# Patient Record
Sex: MALE | Race: WHITE
[De-identification: names, ages, dates, MRNs, and addresses within clinical notes are randomized per-mention and may not be internally consistent; named-entity substitution may affect disease eponyms.]

---

## 2019-11-02 ENCOUNTER — RECORD ABSTRACTING (OUTPATIENT)
Age: 34
End: 2019-11-02

## 2019-11-02 PROBLEM — Z00.00 ENCOUNTER FOR PREVENTIVE HEALTH EXAMINATION: Status: ACTIVE | Noted: 2019-11-02

## 2020-11-24 ENCOUNTER — TRANSCRIPTION ENCOUNTER (OUTPATIENT)
Age: 35
End: 2020-11-24

## 2020-12-16 ENCOUNTER — TRANSCRIPTION ENCOUNTER (OUTPATIENT)
Age: 35
End: 2020-12-16

## 2021-01-05 ENCOUNTER — TRANSCRIPTION ENCOUNTER (OUTPATIENT)
Age: 36
End: 2021-01-05

## 2021-02-23 ENCOUNTER — TRANSCRIPTION ENCOUNTER (OUTPATIENT)
Age: 36
End: 2021-02-23

## 2021-02-27 ENCOUNTER — TRANSCRIPTION ENCOUNTER (OUTPATIENT)
Age: 36
End: 2021-02-27

## 2023-03-12 ENCOUNTER — FORM ENCOUNTER (OUTPATIENT)
Age: 38
End: 2023-03-12

## 2023-03-13 ENCOUNTER — APPOINTMENT (OUTPATIENT)
Dept: ORTHOPEDIC SURGERY | Facility: CLINIC | Age: 38
End: 2023-03-13
Payer: COMMERCIAL

## 2023-03-13 ENCOUNTER — APPOINTMENT (OUTPATIENT)
Dept: MRI IMAGING | Facility: CLINIC | Age: 38
End: 2023-03-13
Payer: COMMERCIAL

## 2023-03-13 VITALS — WEIGHT: 200 LBS | HEIGHT: 74 IN | BODY MASS INDEX: 25.67 KG/M2

## 2023-03-13 DIAGNOSIS — R22.2 LOCALIZED SWELLING, MASS AND LUMP, TRUNK: ICD-10-CM

## 2023-03-13 DIAGNOSIS — Z78.9 OTHER SPECIFIED HEALTH STATUS: ICD-10-CM

## 2023-03-13 PROCEDURE — L3670: CPT | Mod: LT

## 2023-03-13 PROCEDURE — 71550 MRI CHEST W/O DYE: CPT | Mod: LT

## 2023-03-13 PROCEDURE — 99204 OFFICE O/P NEW MOD 45 MIN: CPT

## 2023-03-13 RX ORDER — NAPROXEN 500 MG/1
500 TABLET ORAL
Qty: 60 | Refills: 2 | Status: ACTIVE | COMMUNITY
Start: 2023-03-13 | End: 1900-01-01

## 2023-03-14 PROBLEM — R22.2 LOCALIZED SWELLING OF CHEST WALL: Status: ACTIVE | Noted: 2023-03-14

## 2023-03-14 NOTE — IMAGING
[de-identified] : Constitutional: Healthy, and well nourished in no acute distress. \par Psych: Calm, cooperative, grossly normal \par Eyes: Normal, sclera non-icteric \par Ears, Nose, Mouth, Throat: External inspection of nose and ears does not reveal any scars or masses \par Head: Normocephalic \par Neck: Neck appears supple without sign of limited or painful ROM \par Respiratory: Normal effort, no respiratory distress, no cyanosis \par Cardiovascular: Visualized extremities without edema or varicosities, warm, brisk cap refill \par Abdominal/GI: Not examined \par Skin:none on the exposed UE\par Neurological: Patient is awake and alert  \par \par Left chest wall exam\par \par Inspection: ASymmetric with left chest wall swelling about the pectoralis region.  No overt ecchymosis.  No erythema.\par Spurling; No midline point tenderness\par Arm is at side, struggles to remove shirt as any arm motion away from body is uncomfortable.\par \par Palpation: Tenderness at left pectoralis muscle into the medial aspect of the humerus.  Biceps proximal and distal is nontender.  NO overt deformity with biceps contraction with 90deg of elbow flexion\par No tenderness at coracoid, SC, AC, clavicle\par \par Direct palpation of AC Joint: Absent for tenderness\par Crepitus: NO   \par Masses: No palpable masses \par \par Shoulder ROM\par pain with maneuvering arm away from the body in an adducted position, thus i was deferred due to acute injury\par             \par deferred\par \par ARM:  \par Inspection: Muscle Atrophy:absent             \par Palpation: Tenderness as above        \par Crepitus: NO \par Masses : NO \par \par \par

## 2023-03-14 NOTE — DATA REVIEWED
[FreeTextEntry1] : deferred xrays for stat MRI\par \par STAT MRI obtained and patient returned to office\par Reviewed briefly with Dr. Markham- official read not yet back but there is a muscular tear to the pectoralis major muscle on the left chest wall.  maybe some mild signal seen at myotendinous junction but pec major tendon attached at humerus

## 2023-03-14 NOTE — DISCUSSION/SUMMARY
[de-identified] : The patient was advised of the diagnosis. The natural history of the pathology was explained in full to the patient iin layman's terms. \par Here is the plan that we have set forth today.\par 1. stat MRI now then return to the office\par 2. sling for comfort\par 3. Consideration and discussion about using Aleve 500mg BID for pain control for the next weeks-10 days depending on pathology.\par \par The patient and the family understands the plan of care as described above.  All questions have been answered.\par Thank you for allowing me to care for ELHAM.\par Sincerely,\par Diamante Duarte, DO, FAAP, CAQ-SM\par Sports Medicine\par \par \par addendum: patient returned a few hours post his initial presentation.  We were able to review MRI and note that his injury was nonsurgical in nature.\par He was given a sling- prescribed some pain medication for the next week, continue with icing,  told to keep activity very light and follow up in 2 weeks.  In 2 weeks we will reassess tenderness and hopefully start some PT, wean sling.\par any other questions patient is instructed to call the office.

## 2023-03-29 ENCOUNTER — APPOINTMENT (OUTPATIENT)
Dept: ORTHOPEDIC SURGERY | Facility: CLINIC | Age: 38
End: 2023-03-29
Payer: COMMERCIAL

## 2023-03-29 VITALS — WEIGHT: 200 LBS | HEIGHT: 74 IN | BODY MASS INDEX: 25.67 KG/M2

## 2023-03-29 DIAGNOSIS — S29.011D STRAIN OF MUSCLE AND TENDON OF FRONT WALL OF THORAX, SUBSEQUENT ENCOUNTER: ICD-10-CM

## 2023-03-29 PROCEDURE — 99213 OFFICE O/P EST LOW 20 MIN: CPT

## 2023-03-29 NOTE — IMAGING
[de-identified] : Neurological: Patient is awake and alert \par \par Left chest wall exam\par Inspection: ASymmetric with left chest wall swelling (mild today and improved from initial visit) about the pectoralis region. No overt ecchymosis. No erythema.\par C-spine:No midline point tenderness\par Has markedly improved Shoulder ROM today- near complete in all planes- deferred on IR behind back.\par With arm at 90 deg of shoulder flexion and external rotation- there is a possible soft tissue deformity seen in the chest wall in mid muscle belly- possibly area of tear. (that is not appreciated when arm is by his side.)\par Palpation: mild Tenderness at left pectoralis muscle into the medial aspect of the humerus (much improved from initial visit). Biceps proximal and distal is nontender.Hook test- +palpable distal biceps tendon.\par NO overt deformity with biceps contraction with 90deg of elbow flexion\par No tenderness at coracoid, SC, AC, clavicle\par \par Direct palpation of AC Joint: Absent for tenderness\par Crepitus: NO \par Masses: No palpable masses \par \par strength- asked to go slow and not 100% but good active FF< abd and biceps engagement.  Also 4+ with chest adduction.\par No overt issue with arm adduction towards opposite side into a stretched position.\par ARM: \par Inspection: Muscle Atrophy:absent \par Palpation: Tenderness as above \par Crepitus: NO \par Masses : NO \par

## 2023-03-29 NOTE — HISTORY OF PRESENT ILLNESS
[Throbbing] : throbbing [Full time] : Work status: full time [1] : 2 [0] : 0 [Occasional] : occasional [de-identified] : 3/28/23: TODAY- pt is here for follow up visit.  He is doing better.  Swelling is improved.  He did end up developing some ecchymosis in the upper medial humeral area- that has all resolved. He is  feeling better- tender and occasionally gets a reminder of pain when she reaches or stretches out quickly.  Occasionally when rolling over in bed but it is much improved overall. Has gone to gym and done some super light biceps and upper extremity work but no chest.  No paresthesias.  No radicular pain\par \par \par 3/13/23:  38 yo male with left chest wall pain here for initial exam. He states he was in gym at 8:30am this morning, was doing bench press when he felt a pop.  He did warm up and stretch before starting workup.\par Was on set two- typical amount of weight in a full flexed position- bar to chest and felt a pop to the left chest wall.\par \par Immediately stopped workout- Applied ice this morning after injury. and made the appointment with us today.\par He had a brief amount of paresthesias on ulnar side of forearm.\par Most of the pain is left pec into left medial humerus area.\par \par Swelling seen but no overt ecchymosis.\par \par Otherwise healthy and well\par \par Takes finesteride for hair loss otherwise no prescription meds.\par \par ALLERGY to ceclor.\par \par \par \par \par Review of Systems: \par Constitutional:  no fever, fatigue or recent weight loss \par HEENT: negative \par CV: negative \par Pulm: negative \par GI: negative \par : negative \par Neuro: negative \par Skin: negative \par Endocrine: negative \par Heme: negative \par MSK: See HPI.\par  [] : no [FreeTextEntry1] : left shoulder [de-identified] : MRI [de-identified] :

## 2023-03-29 NOTE — DISCUSSION/SUMMARY
[de-identified] : \par The patient was advised of the diagnosis- changes I am seeing in the office today, and we discussed plans going forward.\par 1. at this point he is much improved but now we need to begin some PT to assist with swelling control even further, work with early healing and scar tissue formation as well as eventual stretching and restrengthening in and around the area.\par 2. PT prescription placed and locations advised.\par 3. continue to exude caution with activity, no chest exercises in the gym; ice if sore.\par 4. Follow up in 6 weeks to recheck progress with PT at that time.\par \par The patient understands the plan of care as described above.  All questions have been answered.\par Thank you for allowing me to care for ELHAM.\par Sincerely,\par Diamante Duarte, DO, FAAP, CAQ-SM\par Sports Medicine\par \par

## 2023-05-10 ENCOUNTER — APPOINTMENT (OUTPATIENT)
Dept: ORTHOPEDIC SURGERY | Facility: CLINIC | Age: 38
End: 2023-05-10
Payer: COMMERCIAL

## 2023-05-10 VITALS — HEIGHT: 74 IN | WEIGHT: 200 LBS | BODY MASS INDEX: 25.67 KG/M2

## 2023-05-10 DIAGNOSIS — S29.011D STRAIN OF MUSCLE AND TENDON OF FRONT WALL OF THORAX, SUBSEQUENT ENCOUNTER: ICD-10-CM

## 2023-05-10 PROCEDURE — 99213 OFFICE O/P EST LOW 20 MIN: CPT

## 2023-05-12 NOTE — IMAGING
[de-identified] : \par Neurological: Patient is awake and alert \par \par Left chest wall exam\par Inspection: ASymmetric  chest - pectoralis muscle larger on the left than the right.  With pec contraction- resisted Adduction there is left chest wall deformity. a bit of a gap, and a pulling away at the midline and some deformity more laterally where the tear was. No overt ecchymosis. No erythema.\par Normal shoulder ROM and 5/5 strength with deltoid, biceps, external rotation and only 4+/5 with adduction\par Palpation: no tenderness to the left pec muscle.\par NO overt deformity with biceps contraction with 90deg of elbow flexion\par No tenderness at coracoid, SC, AC, clavicle\par \par Direct palpation of AC Joint: Absent for tenderness\par Crepitus: NO \par Masses: No palpable masses \par \par ARM: \par Inspection: Muscle Atrophy:absent \par Palpation: Tenderness as above \par Crepitus: NO \par Masses : NO \par  \par

## 2023-05-12 NOTE — IMAGING
[de-identified] : \par Neurological: Patient is awake and alert \par \par Left chest wall exam\par Inspection: ASymmetric  chest - pectoralis muscle larger on the left than the right.  With pec contraction- resisted Adduction there is left chest wall deformity. a bit of a gap, and a pulling away at the midline and some deformity more laterally where the tear was. No overt ecchymosis. No erythema.\par Normal shoulder ROM and 5/5 strength with deltoid, biceps, external rotation and only 4+/5 with adduction\par Palpation: no tenderness to the left pec muscle.\par NO overt deformity with biceps contraction with 90deg of elbow flexion\par No tenderness at coracoid, SC, AC, clavicle\par \par Direct palpation of AC Joint: Absent for tenderness\par Crepitus: NO \par Masses: No palpable masses \par \par ARM: \par Inspection: Muscle Atrophy:absent \par Palpation: Tenderness as above \par Crepitus: NO \par Masses : NO \par  \par

## 2023-05-12 NOTE — DISCUSSION/SUMMARY
[de-identified] : \par The patient was advised of the diagnosis- changes I am seeing in the office today and plans going forward.\par likely the deformity will not change\par continue to build strength slowly; follow the 10% rule such that you don’t increase weight or reps too quickly\par continue anterior chest wall stretches.\par continue to work postural stability and scapulothoracic strength.\par follow up with me as needed.\par The patient understands the plan of care as described above.  All questions have been answered.\par Thank you for allowing me to care for ELHAM.\par Sincerely,\par Diamante Duarte, DO, FAAP, CAQ-SM\par Sports Medicine\par \par

## 2023-05-12 NOTE — HISTORY OF PRESENT ILLNESS
[8] : 8 [5] : 5 [Nothing helps with pain getting better] : Nothing helps with pain getting better [de-identified] : today 5/10/23: patient returns for follow up.\par He has been in PT at [facility]- about 10 sessions to date.  WOrking on easy pec strengthening, pec and anterior chest wall flexibility as well as some posterior scapulothoracic work.\par He is back in the gym.  Most other aspects of his routine on unchanged and he has been able to get back to biceps, triceps and core.\par He is doing inclined table push up, but has not gone completely horizontal to date.\par \par 3/28/23: - pt is here for follow up visit.  He is doing better.  Swelling is improved.  He did end up developing some ecchymosis in the upper medial humeral area- that has all resolved. He is  feeling better- tender and occasionally gets a reminder of pain when she reaches or stretches out quickly.  Occasionally when rolling over in bed but it is much improved overall. Has gone to gym and done some super light biceps and upper extremity work but no chest.  No paresthesias.  No radicular pain\par \par \par 3/13/23:  36 yo male with left chest wall pain here for initial exam. He states he was in gym at 8:30am this morning, was doing bench press when he felt a pop.  He did warm up and stretch before starting workup.\par Was on set two- typical amount of weight in a full flexed position- bar to chest and felt a pop to the left chest wall.\par \par Immediately stopped workout- Applied ice this morning after injury. and made the appointment with us today.\par He had a brief amount of paresthesias on ulnar side of forearm.\par Most of the pain is left pec into left medial humerus area.\par \par Swelling seen but no overt ecchymosis.\par \par Otherwise healthy and well\par \par Takes finesteride for hair loss otherwise no prescription meds.\par \par ALLERGY to ceclor.\par \par \par \par \par Review of Systems: \par Constitutional:  no fever, fatigue or recent weight loss \par \par Neuro: negative \par Skin: negative \par Endocrine: negative \par Heme: negative \par MSK: See HPI.\par  [] : no

## 2023-05-12 NOTE — HISTORY OF PRESENT ILLNESS
[8] : 8 [5] : 5 [Nothing helps with pain getting better] : Nothing helps with pain getting better [de-identified] : today 5/10/23: patient returns for follow up.\par He has been in PT at [facility]- about 10 sessions to date.  WOrking on easy pec strengthening, pec and anterior chest wall flexibility as well as some posterior scapulothoracic work.\par He is back in the gym.  Most other aspects of his routine on unchanged and he has been able to get back to biceps, triceps and core.\par He is doing inclined table push up, but has not gone completely horizontal to date.\par \par 3/28/23: - pt is here for follow up visit.  He is doing better.  Swelling is improved.  He did end up developing some ecchymosis in the upper medial humeral area- that has all resolved. He is  feeling better- tender and occasionally gets a reminder of pain when she reaches or stretches out quickly.  Occasionally when rolling over in bed but it is much improved overall. Has gone to gym and done some super light biceps and upper extremity work but no chest.  No paresthesias.  No radicular pain\par \par \par 3/13/23:  36 yo male with left chest wall pain here for initial exam. He states he was in gym at 8:30am this morning, was doing bench press when he felt a pop.  He did warm up and stretch before starting workup.\par Was on set two- typical amount of weight in a full flexed position- bar to chest and felt a pop to the left chest wall.\par \par Immediately stopped workout- Applied ice this morning after injury. and made the appointment with us today.\par He had a brief amount of paresthesias on ulnar side of forearm.\par Most of the pain is left pec into left medial humerus area.\par \par Swelling seen but no overt ecchymosis.\par \par Otherwise healthy and well\par \par Takes finesteride for hair loss otherwise no prescription meds.\par \par ALLERGY to ceclor.\par \par \par \par \par Review of Systems: \par Constitutional:  no fever, fatigue or recent weight loss \par \par Neuro: negative \par Skin: negative \par Endocrine: negative \par Heme: negative \par MSK: See HPI.\par  [] : no

## 2023-05-12 NOTE — DISCUSSION/SUMMARY
[de-identified] : \par The patient was advised of the diagnosis- changes I am seeing in the office today and plans going forward.\par likely the deformity will not change\par continue to build strength slowly; follow the 10% rule such that you don’t increase weight or reps too quickly\par continue anterior chest wall stretches.\par continue to work postural stability and scapulothoracic strength.\par follow up with me as needed.\par The patient understands the plan of care as described above.  All questions have been answered.\par Thank you for allowing me to care for ELHAM.\par Sincerely,\par Diamante Duarte, DO, FAAP, CAQ-SM\par Sports Medicine\par \par

## 2023-11-29 ENCOUNTER — APPOINTMENT (OUTPATIENT)
Dept: ORTHOPEDIC SURGERY | Facility: CLINIC | Age: 38
End: 2023-11-29
Payer: COMMERCIAL

## 2023-11-29 DIAGNOSIS — S43.402D UNSPECIFIED SPRAIN OF LEFT SHOULDER JOINT, SUBSEQUENT ENCOUNTER: ICD-10-CM

## 2023-11-29 PROCEDURE — 99213 OFFICE O/P EST LOW 20 MIN: CPT

## 2023-11-29 PROCEDURE — 73030 X-RAY EXAM OF SHOULDER: CPT | Mod: LT

## 2023-12-05 ENCOUNTER — APPOINTMENT (OUTPATIENT)
Dept: MRI IMAGING | Facility: CLINIC | Age: 38
End: 2023-12-05
Payer: COMMERCIAL

## 2023-12-05 PROCEDURE — 73221 MRI JOINT UPR EXTREM W/O DYE: CPT | Mod: LT

## 2023-12-12 ENCOUNTER — APPOINTMENT (OUTPATIENT)
Dept: ORTHOPEDIC SURGERY | Facility: CLINIC | Age: 38
End: 2023-12-12
Payer: COMMERCIAL

## 2023-12-12 VITALS — HEIGHT: 74 IN | BODY MASS INDEX: 25.67 KG/M2 | WEIGHT: 200 LBS

## 2023-12-12 PROCEDURE — 99213 OFFICE O/P EST LOW 20 MIN: CPT

## 2024-01-09 ENCOUNTER — APPOINTMENT (OUTPATIENT)
Age: 39
End: 2024-01-09
Payer: COMMERCIAL

## 2024-01-09 PROCEDURE — 29826 SHO ARTHRS SRG DECOMPRESSION: CPT | Mod: AS,LT

## 2024-01-09 PROCEDURE — 29806 SHO ARTHRS SRG CAPSULORRAPHY: CPT | Mod: AS,LT

## 2024-01-09 PROCEDURE — 29826 SHO ARTHRS SRG DECOMPRESSION: CPT | Mod: LT

## 2024-01-09 PROCEDURE — 29806 SHO ARTHRS SRG CAPSULORRAPHY: CPT | Mod: LT

## 2024-01-09 RX ORDER — ASPIRIN 325 MG/1
325 TABLET, FILM COATED ORAL DAILY
Qty: 14 | Refills: 0 | Status: ACTIVE | COMMUNITY
Start: 2024-01-09 | End: 1900-01-01

## 2024-01-09 RX ORDER — HYDROCODONE BITARTRATE AND ACETAMINOPHEN 5; 325 MG/1; MG/1
5-325 TABLET ORAL
Qty: 30 | Refills: 0 | Status: ACTIVE | COMMUNITY
Start: 2024-01-09 | End: 1900-01-01

## 2024-01-17 ENCOUNTER — APPOINTMENT (OUTPATIENT)
Dept: ORTHOPEDIC SURGERY | Facility: CLINIC | Age: 39
End: 2024-01-17
Payer: COMMERCIAL

## 2024-01-17 VITALS — WEIGHT: 200 LBS | HEIGHT: 74 IN | BODY MASS INDEX: 25.67 KG/M2

## 2024-01-17 PROCEDURE — 99024 POSTOP FOLLOW-UP VISIT: CPT

## 2024-01-17 NOTE — HISTORY OF PRESENT ILLNESS
[3] : 3 [Dull/Aching] : dull/aching [de-identified] : Patient s/p left shoulder arthroscopic labral repair, SAD on 1/9/24. PAtient well maintained in the sling, no fever/chills. PAin is manageable.  [] : This patient has had an injection before: no [FreeTextEntry1] : left shoulder  [de-identified] : none  [de-identified] : 1/9/24

## 2024-01-17 NOTE — DISCUSSION/SUMMARY
[de-identified] : Surgical findings reviewed with the patient. Start PT program and dc sling the week of 1/29/24, no ER. HEP to begin at that time reviewed with the patient.  Activities and restrictions reviewed.  01/17/2024    RE:  [facility] MEREDITH   [id number]    Attention:  Nurse Reviewer /Medical Director  I am writing this letter as a medical necessity for PT program. Patient has tried analgesics, non-steroid anti-inflammatory agents,  hot or cold compresses,injections of corticosteroids, etc)  which in combination or by themselves has not worked. Based on my patient's condition, I strongly believe that the PT is medically needed.   Thank you for your time and consideration.     BEGIN WK OF 1/29

## 2024-02-28 ENCOUNTER — APPOINTMENT (OUTPATIENT)
Dept: ORTHOPEDIC SURGERY | Facility: CLINIC | Age: 39
End: 2024-02-28
Payer: COMMERCIAL

## 2024-02-28 PROCEDURE — 99024 POSTOP FOLLOW-UP VISIT: CPT

## 2024-02-28 NOTE — HISTORY OF PRESENT ILLNESS
[3] : 3 [Dull/Aching] : dull/aching [de-identified] : Patient s/p left shoulder arthroscopic labral repair, SAD on 1/9/24. Attending PT Program and is feeling better with the therapy.  [] : This patient has had an injection before: no [FreeTextEntry1] : left shoulder  [FreeTextEntry5] : Pt has been feeling relief with physical therapy  [de-identified] : none  [de-identified] : 1/9/24 [de-identified] : labral repair and SAD

## 2024-02-28 NOTE — DISCUSSION/SUMMARY
[de-identified] : Continue with PT program. HEP discussed. PT advanced.   02/28/2024    RE:  ELHAM HARPER   Acct #- [id number]    Attention:  Nurse Reviewer /Medical Director  I am writing this letter as a medical necessity for PT program. Patient has tried analgesics, non-steroid anti-inflammatory agents,  hot or cold compresses,injections of corticosteroids, etc)  which in combination or by themselves has not worked. Based on my patient's condition, I strongly believe that the PT is medically needed.   Thank you for your time and consideration.

## 2024-04-03 ENCOUNTER — APPOINTMENT (OUTPATIENT)
Dept: ORTHOPEDIC SURGERY | Facility: CLINIC | Age: 39
End: 2024-04-03
Payer: COMMERCIAL

## 2024-04-03 DIAGNOSIS — M75.42 IMPINGEMENT SYNDROME OF LEFT SHOULDER: ICD-10-CM

## 2024-04-03 DIAGNOSIS — S43.432D SUPERIOR GLENOID LABRUM LESION OF LEFT SHOULDER, SUBSEQUENT ENCOUNTER: ICD-10-CM

## 2024-04-03 PROCEDURE — 99024 POSTOP FOLLOW-UP VISIT: CPT

## 2024-04-03 NOTE — DISCUSSION/SUMMARY
[de-identified] : modify activities and gently resume next week try OTC meds ice as needed try topical lidocaine reviewed current medications used by this patient 04/3/2024    RE:  ELHAM HARPER   Acct #- [id number]    Attention:  Nurse Reviewer /Medical Director  I am writing this letter as a medical necessity for PT program. Patient has tried analgesics, non-steroid anti-inflammatory agents,  hot or cold compresses,injections of corticosteroids, etc)  which in combination or by themselves has not worked. Based on my patient's condition, I strongly believe that the PT is medically needed.   Thank you for your time and consideration.

## 2024-07-23 ENCOUNTER — APPOINTMENT (OUTPATIENT)
Dept: ORTHOPEDIC SURGERY | Facility: CLINIC | Age: 39
End: 2024-07-23
Payer: COMMERCIAL

## 2024-07-23 VITALS — HEIGHT: 73 IN | BODY MASS INDEX: 27.17 KG/M2 | WEIGHT: 205 LBS

## 2024-07-23 DIAGNOSIS — Z00.00 ENCOUNTER FOR GENERAL ADULT MEDICAL EXAMINATION W/OUT ABNORMAL FINDINGS: ICD-10-CM

## 2024-07-23 DIAGNOSIS — S92.341A DISPLACED FRACTURE OF FOURTH METATARSAL BONE, RIGHT FOOT, INITIAL ENCOUNTER FOR CLOSED FRACTURE: ICD-10-CM

## 2024-07-23 PROCEDURE — L3260: CPT | Mod: RT

## 2024-07-23 PROCEDURE — 28470 CLTX METATARSAL FX WO MNP EA: CPT

## 2024-07-23 PROCEDURE — 73630 X-RAY EXAM OF FOOT: CPT | Mod: RT

## 2024-07-23 PROCEDURE — 99204 OFFICE O/P NEW MOD 45 MIN: CPT | Mod: 25
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
## 2024-07-23 NOTE — ASSESSMENT
[FreeTextEntry1] : wbat hard soled shoe limit activity ice/elevate nsaids prn f/up 3 wks w/ foot xray

## 2024-07-23 NOTE — PHYSICAL EXAM
[Right] : right foot and ankle [Mild] : mild swelling of dorsal foot [NL (40)] : plantar flexion 40 degrees [NL 30)] : inversion 30 degrees [NL (20)] : eversion 20 degrees [5___] : [facility] 5[unfilled]/5 [2+] : dorsalis pedis pulse: 2+ [] : patient ambulates without assistive device [FreeTextEntry8] : dorsal distal forefoot ttp

## 2024-08-15 ENCOUNTER — APPOINTMENT (OUTPATIENT)
Dept: ORTHOPEDIC SURGERY | Facility: CLINIC | Age: 39
End: 2024-08-15
Payer: COMMERCIAL

## 2024-08-15 DIAGNOSIS — S92.341D DISPLACED FRACTURE OF FOURTH METATARSAL BONE, RIGHT FOOT, SUBSEQUENT ENCOUNTER FOR FRACTURE WITH ROUTINE HEALING: ICD-10-CM

## 2024-08-15 PROCEDURE — 73630 X-RAY EXAM OF FOOT: CPT | Mod: RT

## 2024-08-15 PROCEDURE — 99024 POSTOP FOLLOW-UP VISIT: CPT
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
## 2024-08-15 NOTE — IMAGING
[Right] : right foot [Weight -] : weightbearing [The fracture is in acceptable alignment. There is progression in healing seen] : The fracture is in acceptable alignment. There is progression in healing seen [de-identified] : 4th mt neck fx

## 2024-08-15 NOTE — PHYSICAL EXAM
[Right] : right foot and ankle [Mild] : mild swelling of dorsal foot [NL (40)] : plantar flexion 40 degrees [NL 30)] : inversion 30 degrees [NL (20)] : eversion 20 degrees [5___] : [facility] 5[unfilled]/5 [2+] : dorsalis pedis pulse: 2+ [] : no pain on mid-foot stress [FreeTextEntry8] : dorsal distal forefoot ttp--improved

## 2024-08-22 ENCOUNTER — APPOINTMENT (OUTPATIENT)
Dept: ORTHOPEDIC SURGERY | Facility: CLINIC | Age: 39
End: 2024-08-22
Payer: COMMERCIAL

## 2024-08-22 DIAGNOSIS — S46.912D STRAIN OF UNSPECIFIED MUSCLE, FASCIA AND TENDON AT SHOULDER AND UPPER ARM LEVEL, LEFT ARM, SUBSEQUENT ENCOUNTER: ICD-10-CM

## 2024-08-22 PROCEDURE — 99214 OFFICE O/P EST MOD 30 MIN: CPT

## 2024-08-22 RX ORDER — MELOXICAM 15 MG/1
15 TABLET ORAL
Qty: 30 | Refills: 0 | Status: ACTIVE | COMMUNITY
Start: 2024-08-22 | End: 1900-01-01

## 2024-08-22 NOTE — HISTORY OF PRESENT ILLNESS
[3] : 3 [Dull/Aching] : dull/aching [de-identified] : Patient s/p left shoulder arthroscopic labral repair, SAD on 1/9/24. Was doing well until this episode, tried ice and rest and feels weaker, certain motions bother him [] : This patient has had an injection before: no [FreeTextEntry1] : left shoulder  [FreeTextEntry5] : Pt has been feeling relief with physical therapy  [de-identified] : none  [de-identified] : 1/9/24 [de-identified] : labral repair and SAD

## 2024-08-22 NOTE — DISCUSSION/SUMMARY
[de-identified] : modify activities  try OTC meds ice as needed try topical lidocaine will re-eval in 2 weeks if no resolution then MRA

## 2024-09-05 ENCOUNTER — APPOINTMENT (OUTPATIENT)
Dept: ORTHOPEDIC SURGERY | Facility: CLINIC | Age: 39
End: 2024-09-05
Payer: COMMERCIAL

## 2024-09-05 VITALS — WEIGHT: 205 LBS | HEIGHT: 73 IN | BODY MASS INDEX: 27.17 KG/M2

## 2024-09-05 DIAGNOSIS — S43.432D SUPERIOR GLENOID LABRUM LESION OF LEFT SHOULDER, SUBSEQUENT ENCOUNTER: ICD-10-CM

## 2024-09-05 PROCEDURE — 99213 OFFICE O/P EST LOW 20 MIN: CPT

## 2024-09-05 NOTE — HISTORY OF PRESENT ILLNESS
[Gradual] : gradual [Dull/Aching] : dull/aching [Occasional] : occasional [Rest] : rest [de-identified] : Patient s/p left shoulder arthroscopic labral repair, SAD on 1/9/24. Feeling better and on no meds [3] : 3 [] : This patient has had an injection before: no [FreeTextEntry1] : left shoulder  [FreeTextEntry5] : Pt has been feeling relief with physical therapy  [de-identified] : lifting arm  [de-identified] : none  [de-identified] : 1/9/24 [de-identified] : labral repair and SAD

## 2024-09-05 NOTE — PHYSICAL EXAM
[Left] : left shoulder [5___] : external rotation 5[unfilled]/5 [] : motor and sensory intact distally [FreeTextEntry8] : min

## 2024-09-05 NOTE — DISCUSSION/SUMMARY
[de-identified] : modify activities  try OTC meds ice as needed try topical lidocaine no MRA needed currently  09/05/2024    RE:  ELHAM RODER   Acct #- [id number]    Attention:  Nurse Reviewer /Medical Director  I am writing this letter as a medical necessity for PT program. Patient has tried analgesics, non-steroid anti-inflammatory agents,  hot or cold compresses,injections of corticosteroids, etc)  which in combination or by themselves has not worked. Based on my patient's condition, I strongly believe that the PT is medically needed.   Thank you for your time and consideration.

## 2024-12-12 ENCOUNTER — APPOINTMENT (OUTPATIENT)
Dept: ORTHOPEDIC SURGERY | Facility: CLINIC | Age: 39
End: 2024-12-12
Payer: COMMERCIAL

## 2024-12-12 VITALS — HEIGHT: 73 IN | WEIGHT: 205 LBS | BODY MASS INDEX: 27.17 KG/M2

## 2024-12-12 DIAGNOSIS — M77.8 OTHER ENTHESOPATHIES, NOT ELSEWHERE CLASSIFIED: ICD-10-CM

## 2024-12-12 PROCEDURE — 99214 OFFICE O/P EST MOD 30 MIN: CPT

## 2024-12-12 RX ORDER — CELECOXIB 200 MG/1
200 CAPSULE ORAL
Qty: 30 | Refills: 0 | Status: ACTIVE | COMMUNITY
Start: 2024-12-12 | End: 1900-01-01

## 2025-01-29 ENCOUNTER — APPOINTMENT (OUTPATIENT)
Dept: ORTHOPEDIC SURGERY | Facility: CLINIC | Age: 40
End: 2025-01-29

## 2025-02-27 ENCOUNTER — APPOINTMENT (OUTPATIENT)
Age: 40
End: 2025-02-27
Payer: COMMERCIAL

## 2025-02-27 VITALS — HEIGHT: 73 IN | BODY MASS INDEX: 26.51 KG/M2 | WEIGHT: 200 LBS

## 2025-02-27 DIAGNOSIS — M77.42 METATARSALGIA, RIGHT FOOT: ICD-10-CM

## 2025-02-27 DIAGNOSIS — M79.672 PAIN IN LEFT FOOT: ICD-10-CM

## 2025-02-27 DIAGNOSIS — M79.671 PAIN IN RIGHT FOOT: ICD-10-CM

## 2025-02-27 DIAGNOSIS — M77.41 METATARSALGIA, RIGHT FOOT: ICD-10-CM

## 2025-02-27 PROCEDURE — 99203 OFFICE O/P NEW LOW 30 MIN: CPT

## 2025-02-28 ENCOUNTER — NON-APPOINTMENT (OUTPATIENT)
Age: 40
End: 2025-02-28

## 2025-02-28 DIAGNOSIS — Z87.828 PERSONAL HISTORY OF OTHER (HEALED) PHYSICAL INJURY AND TRAUMA: ICD-10-CM
